# Patient Record
Sex: FEMALE | Race: WHITE | NOT HISPANIC OR LATINO | Employment: FULL TIME | URBAN - METROPOLITAN AREA
[De-identification: names, ages, dates, MRNs, and addresses within clinical notes are randomized per-mention and may not be internally consistent; named-entity substitution may affect disease eponyms.]

---

## 2019-08-09 ENCOUNTER — APPOINTMENT (OUTPATIENT)
Dept: RADIOLOGY | Facility: CLINIC | Age: 24
End: 2019-08-09
Payer: COMMERCIAL

## 2019-08-09 ENCOUNTER — OFFICE VISIT (OUTPATIENT)
Dept: OBGYN CLINIC | Facility: CLINIC | Age: 24
End: 2019-08-09
Payer: COMMERCIAL

## 2019-08-09 VITALS
BODY MASS INDEX: 27.34 KG/M2 | DIASTOLIC BLOOD PRESSURE: 62 MMHG | HEIGHT: 67 IN | SYSTOLIC BLOOD PRESSURE: 115 MMHG | WEIGHT: 174.2 LBS

## 2019-08-09 DIAGNOSIS — M25.562 LEFT KNEE PAIN, UNSPECIFIED CHRONICITY: ICD-10-CM

## 2019-08-09 DIAGNOSIS — M25.561 RIGHT KNEE PAIN, UNSPECIFIED CHRONICITY: ICD-10-CM

## 2019-08-09 DIAGNOSIS — M22.2X1 PATELLOFEMORAL PAIN SYNDROME OF BOTH KNEES: Primary | ICD-10-CM

## 2019-08-09 DIAGNOSIS — M22.2X2 PATELLOFEMORAL PAIN SYNDROME OF BOTH KNEES: Primary | ICD-10-CM

## 2019-08-09 PROCEDURE — 99203 OFFICE O/P NEW LOW 30 MIN: CPT | Performed by: ORTHOPAEDIC SURGERY

## 2019-08-09 PROCEDURE — 73564 X-RAY EXAM KNEE 4 OR MORE: CPT

## 2019-08-09 RX ORDER — LEVONORGESTREL AND ETHINYL ESTRADIOL 0.1-0.02MG
1 KIT ORAL DAILY
COMMUNITY

## 2019-08-09 NOTE — ASSESSMENT & PLAN NOTE
79-year-old female with severe patellofemoral subluxation of her bilateral knees  Currently the right is more symptomatic than the left  We discussed her history and treatment options  Recommended an aggressive course of physical therapy in addition to icing, anti-inflammatories, and low-impact exercise  We did discuss possible need for surgical reconstruction in the future, but she would like to avoid this if at all possible  She will follow up as needed

## 2019-08-09 NOTE — PROGRESS NOTES
Assessment:     1  Patellofemoral pain syndrome of both knees    2  Left knee pain, unspecified chronicity          Plan:     Problem List Items Addressed This Visit        Musculoskeletal and Integument    Patellofemoral pain syndrome of both knees - Primary     79-year-old female with severe patellofemoral subluxation of her bilateral knees  Currently the right is more symptomatic than the left  We discussed her history and treatment options  Recommended an aggressive course of physical therapy in addition to icing, anti-inflammatories, and low-impact exercise  We did discuss possible need for surgical reconstruction in the future, but she would like to avoid this if at all possible  She will follow up as needed  Relevant Orders    XR knee 4+ vw right injury    Ambulatory referral to Physical Therapy      Other Visit Diagnoses     Left knee pain, unspecified chronicity        Relevant Orders    XR knee 4+ vw left injury           Patient ID: Ronel Betancur is a 21 y o  female  Chief Complaint:  Knee pain and swelling    HPI:  79-year-old female here today for bilateral knee pain and problems  She has had problems ever since she was a child  She has a history of patellar dislocations bilaterally  Was a period of time in college where she had her left patella dislocate on a weekly basis  Assist settle down in general   The left tends to bother her more than the right, but more recently over the last week the right has bothered her much more than the left  She has swelling that progresses throughout the course of the day and becomes very severe by the end of the day  This started when she got up out of the shower and heard a popping sound in her knee  She also has recently moved  She has pain going up and down stairs  She has had no recent patellar dislocations  She has not done any physical therapy recently  She wants to avoid surgery if at all possible  She has been icing the knee    She has not been taking anti-inflammatories  The pain is localized to the knee and the pressure from the swelling  Patient's medical intake was reviewed  Allergy:  No Known Allergies    Medications:  all current active meds have been reviewed    Past Medical History:  History reviewed  No pertinent past medical history  Past Surgical History:  Past Surgical History:   Procedure Laterality Date    WISDOM TOOTH EXTRACTION         Family History:  Family History   Problem Relation Age of Onset    Cancer Mother     Hypertension Father        Social History:  Social History     Substance and Sexual Activity   Alcohol Use Not on file     Social History     Substance and Sexual Activity   Drug Use Not on file     Social History     Tobacco Use   Smoking Status Never Smoker   Smokeless Tobacco Never Used           ROS:  Review of Systems   Constitutional: Negative  HENT: Negative  Eyes: Negative  Respiratory: Negative  Cardiovascular: Negative  Gastrointestinal: Negative  Endocrine: Negative  Genitourinary: Negative  Musculoskeletal: Positive for arthralgias and joint swelling  Skin: Negative  Allergic/Immunologic: Negative  Neurological: Negative  Hematological: Negative  Psychiatric/Behavioral: Negative  Objective:  BP Readings from Last 1 Encounters:   08/09/19 115/62      Wt Readings from Last 1 Encounters:   08/09/19 79 kg (174 lb 3 2 oz)        BMI:   Estimated body mass index is 27 28 kg/m² as calculated from the following:    Height as of this encounter: 5' 7" (1 702 m)  Weight as of this encounter: 79 kg (174 lb 3 2 oz)  EXAM:   Physical Exam   Constitutional: She appears well-developed and well-nourished  No distress  HENT:   Head: Normocephalic and atraumatic  Eyes: Right eye exhibits no discharge  Left eye exhibits no discharge  Neck: Normal range of motion  Neck supple  No tracheal deviation present     Cardiovascular: Normal rate and regular rhythm  Pulmonary/Chest: Effort normal and breath sounds normal  No respiratory distress  She exhibits no tenderness  Abdominal: Soft  She exhibits no distension  There is no tenderness  Musculoskeletal:        Right knee: She exhibits no effusion  Neurological: She is alert  Skin: Skin is warm and dry  She is not diaphoretic  No erythema  Psychiatric: She has a normal mood and affect  Her behavior is normal    Vitals reviewed  Right Knee Exam     Other   Erythema: absent  Sensation: normal  Pulse: present  Swelling: none  Effusion: no effusion present    Comments:  Patient with a very large J-sign, reasonable quadriceps strength, mobile patella, mild knee effusion, stable to varus and valgus stress, no medial or lateral joint line tenderness, discomfort with patellar grind, crepitus with knee range of motion, negative Cristi's      Left Knee Exam     Comments:  Patient with a very large J-sign with a palpable clunk at full extension, reasonable quadriceps strength, mobile patella, mild knee effusion, stable to varus and valgus stress, no medial or lateral joint line tenderness, discomfort with patellar grind, crepitus with knee range of motion, negative Cristi's              Radiographs:  I have personally reviewed pertinent films in PACS and my interpretation is X-rays show well maintained medial and lateral joint spaces bilaterally  Both knees have significant patellar tilt with early osteoarthritis on the left knee